# Patient Record
Sex: MALE | Race: BLACK OR AFRICAN AMERICAN | NOT HISPANIC OR LATINO | Employment: OTHER | URBAN - METROPOLITAN AREA
[De-identification: names, ages, dates, MRNs, and addresses within clinical notes are randomized per-mention and may not be internally consistent; named-entity substitution may affect disease eponyms.]

---

## 2022-04-06 ENCOUNTER — OFFICE VISIT (OUTPATIENT)
Dept: NEPHROLOGY | Facility: CLINIC | Age: 82
End: 2022-04-06
Payer: MEDICARE

## 2022-04-06 VITALS
TEMPERATURE: 99 F | SYSTOLIC BLOOD PRESSURE: 130 MMHG | OXYGEN SATURATION: 96 % | HEART RATE: 60 BPM | DIASTOLIC BLOOD PRESSURE: 73 MMHG

## 2022-04-06 DIAGNOSIS — N17.9 ACUTE KIDNEY INJURY: ICD-10-CM

## 2022-04-06 DIAGNOSIS — N18.32 STAGE 3B CHRONIC KIDNEY DISEASE: Primary | ICD-10-CM

## 2022-04-06 DIAGNOSIS — I13.10 CARDIORENAL SYNDROME WITH RENAL FAILURE, STAGE 1-4 OR UNSPECIFIED CHRONIC KIDNEY DISEASE, WITHOUT HEART FAILURE: ICD-10-CM

## 2022-04-06 DIAGNOSIS — D63.1 ANEMIA IN STAGE 3B CHRONIC KIDNEY DISEASE: ICD-10-CM

## 2022-04-06 DIAGNOSIS — N25.81 SECONDARY HYPERPARATHYROIDISM OF RENAL ORIGIN: ICD-10-CM

## 2022-04-06 DIAGNOSIS — I10 ESSENTIAL HYPERTENSION: ICD-10-CM

## 2022-04-06 DIAGNOSIS — N18.32 ANEMIA IN STAGE 3B CHRONIC KIDNEY DISEASE: ICD-10-CM

## 2022-04-06 PROCEDURE — 99214 OFFICE O/P EST MOD 30 MIN: CPT | Mod: ,,, | Performed by: INTERNAL MEDICINE

## 2022-04-06 PROCEDURE — 99214 PR OFFICE/OUTPT VISIT, EST, LEVL IV, 30-39 MIN: ICD-10-PCS | Mod: ,,, | Performed by: INTERNAL MEDICINE

## 2022-04-06 RX ORDER — DIGOXIN 125 MCG
0.12 TABLET ORAL DAILY
COMMUNITY
Start: 2022-02-07

## 2022-04-06 RX ORDER — PHENAZOPYRIDINE HYDROCHLORIDE 200 MG/1
200 TABLET, FILM COATED ORAL 3 TIMES DAILY
COMMUNITY
Start: 2021-10-28 | End: 2022-04-06 | Stop reason: ALTCHOICE

## 2022-04-06 RX ORDER — ASPIRIN 81 MG/1
81 TABLET ORAL
COMMUNITY

## 2022-04-06 RX ORDER — CEPHALEXIN 500 MG/1
500 CAPSULE ORAL 2 TIMES DAILY
COMMUNITY
Start: 2021-10-28 | End: 2022-04-06 | Stop reason: ALTCHOICE

## 2022-04-06 RX ORDER — SODIUM POLYSTYRENE SULFONATE 15 G/60ML
SUSPENSION ORAL; RECTAL
COMMUNITY
Start: 2022-03-17 | End: 2022-04-06 | Stop reason: ALTCHOICE

## 2022-04-06 RX ORDER — ATORVASTATIN CALCIUM 10 MG/1
10 TABLET, FILM COATED ORAL NIGHTLY
COMMUNITY
Start: 2022-01-13 | End: 2022-04-06 | Stop reason: ALTCHOICE

## 2022-04-06 RX ORDER — BICALUTAMIDE 50 MG/1
50 TABLET, FILM COATED ORAL DAILY
COMMUNITY
Start: 2022-01-28

## 2022-04-06 RX ORDER — DICLOFENAC SODIUM 10 MG/G
GEL TOPICAL
COMMUNITY
Start: 2021-12-16 | End: 2022-04-06 | Stop reason: ALTCHOICE

## 2022-04-06 RX ORDER — METOPROLOL TARTRATE 50 MG/1
50 TABLET ORAL 2 TIMES DAILY
COMMUNITY
Start: 2022-01-08

## 2022-04-06 NOTE — PROGRESS NOTES
Pt Name:        Tyron Mcclure  Pt :          1940  Pt MRN:          [id number]     Date: 3/23/2022     Reason for visit:      First office visit for Chronic Kidney Disease.    Serum creatinine 5.24, GFR 11, CKD stage 5.     Chief Complaint:      The patient denies any complaints today.        Assessment & Plan:       Problem #1.  Acute Kidney injury     Assessment:    Resolved       Plan:    The patient is continued asked to discontinue the use of the oral furosemide and to continue oral fluid intake at at least 72 ounces a day.      Otherwise, as in the plan for Problem #4.      Problem #2.  Essential Hypertension     Assessment:    Of very longstanding   Plan:    2 g sodium diet, metoprolol 50 mg by mouth twice daily.      Problem #3.  Type II Cardiorenal Syndrome     Assessment:    With a severely reduced left ventricular ejection fraction at 20-25 % as of the most recent echocardiogram in 2021.      Plan:    Consider repeating the echocardiogram.     2 g sodium diet, metoprolol 50 mg by mouth twice daily.      Problem #4.  Chronic Kidney Disease Stage II     Assessment:    With the calculated glomerular filtration rate at 70 a year ago.   Plan:    Conservative nephrologic managements.     Return for follow up in 4 months with repeat kidney lab work done before the visit       Problem #5.  Multifactorial Anemia     Assessment:    With the acute kidney injury contributing.   Plan:    Ferrous sulfate 325 mg by mouth twice daily     Repeat hemoglobin prior to the visit 4 months.      Problem #6. Hypokalemia     Assessment:    Resolved   Plan:    Repeat serum potassium prior to the visit in 4 months.          HPI:     This is the [facility]th [facility] visit for this 81-year-old man referred by Dr. Trace Jeong was nurse practitioner, Mr. Desmond Coelho, [facility], last week for further evaluation of a calculated glomerular filtration rate of 19 by comparison with a level of 80 a year  ago.     He required hospitalization with a severe decrease in his kidney function in March 2022, and had a urinary tract infection with possibly sepsis at that time..  He was not terribly symptomatic but did have a high potassium also.  He has been making an effort to drink the fluid recommended and to take the sodium polystyrene sulfonate resin that he was provided prescription for prior to discharge.     In the clinic today for follow up of the status of his kidney function, he does not have absent appetite, nausea, chest pain, shortness of breath, shortness of breath awakening him from sound sleep, absent energy, swelling, or trouble thinking.  He does not use NSAIDs or any other over-the-counter medication except acetaminophen for minor pain.        From the standpoint of the risk factors for the development of chronic kidney disease, he does have his longstanding history of hypertension and a more recent history in the past several years of of cardiomyopathy and decreased heart function for which he is followed by Dr. Trace Jeong.          History:           Past Medical History:   Diagnosis Date    Arthritis      Atrial fibrillation      AVM (arteriovenous malformation) of colon, acquired      CHF (congestive heart failure)      Colon polyp 11/17/2011     tubular adenoma    Colon polyp 9/6/2011     tubular adenomas,tubulovillous adenoma,hyperplastic polyp    Colon polyp 9/30/2008     hyperplastic polyps    Colon polyp 10/24/2006     hyperplastic polyps    Colon polyp 10/29/2002     hyperplastic polyps,mixed polyp with both hyperplastic and adenomatous features    Colon polyp 10/16/2001     tubular adenoma,mixed polyps    Colon polyp 4/29/14     hyperplastic    Coronary artery disease      Hyperlipemia      Hypertension      Internal hemorrhoid      Long term (current) use of anticoagulants       Coumadin    Long term use of drug       Digoxin    Nocturia       1 time a night    Presence  of automatic cardioverter/defibrillator (AICD)      Prostate cancer       Prostate            Past Surgical History:   Procedure Laterality Date    CARDIAC DEFIBRILLATOR PLACEMENT   2012    COLON SURGERY        COLONOSCOPY   ,,,2006,,2001    CORONARY ARTERY BYPASS GRAFT        CYSTOSCOPY W/ RETROGRADES   2021     LEFT STENT PLACEMENT    CYSTOSCOPY W/ RETROGRADES   10/28/2021     LEFT STENT EXCHANGE    ESOPHAGOGASTRODUODENOSCOPY   2004    LEG AMPUTATION Left      PROSTATECTOMY   2014            Family History   Problem Relation Age of Onset    Hypertension Mother      Heart disease Sister      Heart disease Brother      Colon cancer Neg Hx      Stomach cancer Neg Hx        Social History          Substance and Sexual Activity   Alcohol Use No      Social History          Substance and Sexual Activity   Drug Use No      Social History          Substance and Sexual Activity   Sexual Activity Not on file     has no history on file for sexual activity.  Social History           Tobacco Use   Smoking Status Former Smoker    Quit date: 2013    Years since quittin.8   Smokeless Tobacco Never Used         Allergies:     No Known Allergies        Current Outpatient Medications:     aspirin (ECOTRIN) 81 mg delayed-release tablet, Take 81 mg by mouth daily.  , Disp: , Rfl:     bicalutamide (Casodex) 50 MG tablet, Take one tablet (50 mg total) by mouth daily, Disp: 90 tablet, Rfl: 3    digoxin (LANOXIN) 125 mcg tablet, Take one tablet (125 mcg total) by mouth every other day for 360 days Indications: chronic heart failure, ventricular rate control in atrial fibrillation, Disp: 45 tablet, Rfl: 3    metoprolol (LOPRESSOR) 50 MG tablet, Take one tablet (50 mg total) by mouth 2 (two) times daily, Disp: 180 tablet, Rfl: 3    SPS, with sorbitol, 15-20 gram/60 mL suspension, TAKE 60 ML BY MOUTH ONCE DAILY FOR 30 DAYS, Disp: , Rfl:     leuprolide, 6 month, (ELIGARD) 45 mg  "syringe, Inject 45 mg into the skin every 6 (six) months, Disp: , Rfl:      ROS:      Constitutional:  Denies fever or chills   Eyes:  Denies change in visual acuity   HENT:  Denies nasal congestion or sore throat   Respiratory:  As in the history of the present illness.   Cardiovascular:  As in the history of the present illness.   GI:  As in the history of the present illness.    Musculoskeletal:  Denies back pain or joint pain   Integument:  Denies rash   Neurologic:  Denies headache, focal weakness or sensory changes   Endocrine:  Denies polyuria or polydipsia   Lymphatic:  Denies swollen glands   Psychiatric:  Denies depression or anxiety     Physical Exam:      Vitals:       Vitals:     03/23/22 1333   BP: 130/73   Pulse: 708   SpO2: 97%   Weight: 133 lb (60.3 kg)   Height: 5' 7" (1.702 m)      Body mass index is 20.83 kg/m².     Constitutional:  Well developed, well nourished, and in no acute distress   Eyes:  PERRLA, conjunctiva normal   HENT:  Atraumatic, external ears normal, nose normal.  Neck: There is no jugular venous distension or thyromegaly.   Respiratory:  No respiratory distress, normal breath sounds, no rales, no wheezing   Cardiovascular:  Normal rate,and a regular rhythm, no murmurs, no gallops, no rub, and no edema.  GI:  Normal bowel sounds.  Musculoskeletal:  No deformities.   Neurologic:  Alert & oriented x 3, CN 2-12 normal, normal motor function, and no asterixis.   Psychiatric:  Speech and behavior appropriate.        Labs/Tests:    Sodium 136 - 147 mmol/L 142    Potassium 3.5 - 5.1 mmol/L 4.0    Chloride 98 - 107 mmol/L 105    CO2 20 - 31 mmol/L 23    Glucose 70 - 99 mg/dL 65 Low     BUN 9 - 23 mg/dL 32 High     Creatinine 0.70 - 1.30 mg/dL 2.25 High     Calcium 8.3 - 10.6 mg/dL 9.2    Phosphorus 2.4 - 5.1 mg/dL 3.0    Albumin 3.2 - 4.8 g/dL 3.8    eGFR CKD-EPI Non Af Amer >90 mL/min/1.73m2 26 Low     eGFR CKD-EPI Af Amer >90 mL/min/1.73m2 30 Low                Follow Up:      Return " for follow-up in 4 months with repeat kidney lab work done before the visit.        This note was created using the voice recognition software currently available to the Medical Staff of the [facility] and its health care facilities. All of the best efforts undertaken to edit the product of that use shall necessarily fall short from time to time. Viewers and reviewers of the product of its use are encouraged to contact this provider for clarification when, and if, the product message is unclear.

## 2022-04-06 NOTE — PATIENT INSTRUCTIONS
The patient has been provided with his current level of kidney function today, and  is asked to return for follow-up, this time, in 4 months with repeat kidney l laboratory work done prior to the visit.

## 2022-08-03 ENCOUNTER — OUTSIDE PLACE OF SERVICE (OUTPATIENT)
Dept: NEPHROLOGY | Facility: CLINIC | Age: 82
End: 2022-08-03
Payer: MEDICARE

## 2022-08-03 PROCEDURE — 99223 1ST HOSP IP/OBS HIGH 75: CPT | Mod: ,,, | Performed by: INTERNAL MEDICINE

## 2022-08-03 PROCEDURE — 99223 PR INITIAL HOSPITAL CARE,LEVL III: ICD-10-PCS | Mod: ,,, | Performed by: INTERNAL MEDICINE

## 2022-08-04 PROCEDURE — 99233 SBSQ HOSP IP/OBS HIGH 50: CPT | Mod: ,,, | Performed by: INTERNAL MEDICINE

## 2022-08-04 PROCEDURE — 99233 PR SUBSEQUENT HOSPITAL CARE,LEVL III: ICD-10-PCS | Mod: ,,, | Performed by: INTERNAL MEDICINE

## 2022-08-05 ENCOUNTER — OUTSIDE PLACE OF SERVICE (OUTPATIENT)
Dept: NEPHROLOGY | Facility: CLINIC | Age: 82
End: 2022-08-05
Payer: MEDICARE

## 2022-08-05 PROCEDURE — 99233 PR SUBSEQUENT HOSPITAL CARE,LEVL III: ICD-10-PCS | Mod: ,,, | Performed by: INTERNAL MEDICINE

## 2022-08-05 PROCEDURE — 99233 SBSQ HOSP IP/OBS HIGH 50: CPT | Mod: ,,, | Performed by: INTERNAL MEDICINE

## 2022-08-06 ENCOUNTER — OUTSIDE PLACE OF SERVICE (OUTPATIENT)
Dept: NEPHROLOGY | Facility: CLINIC | Age: 82
End: 2022-08-06
Payer: MEDICARE

## 2022-08-06 PROCEDURE — 99232 PR SUBSEQUENT HOSPITAL CARE,LEVL II: ICD-10-PCS | Mod: ,,, | Performed by: INTERNAL MEDICINE

## 2022-08-06 PROCEDURE — 99232 SBSQ HOSP IP/OBS MODERATE 35: CPT | Mod: ,,, | Performed by: INTERNAL MEDICINE

## 2022-08-07 PROCEDURE — 99232 SBSQ HOSP IP/OBS MODERATE 35: CPT | Mod: ,,, | Performed by: INTERNAL MEDICINE

## 2022-08-07 PROCEDURE — 99232 PR SUBSEQUENT HOSPITAL CARE,LEVL II: ICD-10-PCS | Mod: ,,, | Performed by: INTERNAL MEDICINE

## 2022-08-08 PROCEDURE — 99232 PR SUBSEQUENT HOSPITAL CARE,LEVL II: ICD-10-PCS | Mod: ,,, | Performed by: INTERNAL MEDICINE

## 2022-08-08 PROCEDURE — 99232 SBSQ HOSP IP/OBS MODERATE 35: CPT | Mod: ,,, | Performed by: INTERNAL MEDICINE

## 2022-08-09 PROCEDURE — 99232 SBSQ HOSP IP/OBS MODERATE 35: CPT | Mod: ,,, | Performed by: INTERNAL MEDICINE

## 2022-08-09 PROCEDURE — 99232 PR SUBSEQUENT HOSPITAL CARE,LEVL II: ICD-10-PCS | Mod: ,,, | Performed by: INTERNAL MEDICINE
